# Patient Record
Sex: MALE | Race: WHITE
[De-identification: names, ages, dates, MRNs, and addresses within clinical notes are randomized per-mention and may not be internally consistent; named-entity substitution may affect disease eponyms.]

---

## 2018-06-25 ENCOUNTER — HOSPITAL ENCOUNTER (OUTPATIENT)
Dept: HOSPITAL 17 - HRAD | Age: 63
End: 2018-06-25
Attending: SPECIALIST
Payer: COMMERCIAL

## 2018-06-25 DIAGNOSIS — R91.1: Primary | ICD-10-CM

## 2018-06-25 PROCEDURE — 71250 CT THORAX DX C-: CPT

## 2018-06-25 NOTE — RADRPT
EXAM DATE:  6/25/2018 2:01 PM EDT

AGE/SEX:        62 years / Male



INDICATIONS:  Lung nodule.



CLINICAL DATA:  This is the patient's initial encounter. Patient reports that signs and symptoms have
 been present for 1 day and indicates a pain score of 0/10. 

                                                                          

MEDICAL/SURGICAL HISTORY:   None. None.



RADIATION DOSE:  13.57 CTDI (mGy)







COMPARISON:      No prior exams available for comparison.



TECHNIQUE:  Multiple contiguous axial images were obtained through the chest without contrast.  Image
s were obtained in suspended respiration using multiple row detector helical technique.  Using automa
flynn exposure control and adjustment of the mA and/or kV according to patient size, radiation dose was
 kept as low as reasonably achievable to obtain optimal diagnostic quality images.



FINDINGS: 

Lungs:  The lungs are symmetrically aerated. Small stellate appearing scar right lung anteriorly.

Mediastinum:  There is good visualization of the great vessels of the middle mediastinum.  No evidenc
e of mediastinal or hilar adenopathy/mass. LAD stent is evident. There is no axillary adenopathy

Pleurae:  No evidence of focal thickening or pleural effusion.

Axillae:  Unremarkable.

Bony Structures:  Unremarkable.

Miscellaneous:  The examination was extended to include the upper abdomen, and both adrenal glands ar
e normal in size and configuration.



CONCLUSION:

1.  No suspicious lung lesions.

2.  I have no prior films for comparison.



Electronically signed by: Jerome Davis MD  6/25/2018 2:07 PM EDT